# Patient Record
Sex: MALE | Race: WHITE | NOT HISPANIC OR LATINO | Employment: OTHER | URBAN - METROPOLITAN AREA
[De-identification: names, ages, dates, MRNs, and addresses within clinical notes are randomized per-mention and may not be internally consistent; named-entity substitution may affect disease eponyms.]

---

## 2017-08-11 ENCOUNTER — GENERIC CONVERSION - ENCOUNTER (OUTPATIENT)
Dept: OTHER | Facility: OTHER | Age: 82
End: 2017-08-11

## 2017-09-15 ENCOUNTER — GENERIC CONVERSION - ENCOUNTER (OUTPATIENT)
Dept: OTHER | Facility: OTHER | Age: 82
End: 2017-09-15

## 2017-12-11 ENCOUNTER — GENERIC CONVERSION - ENCOUNTER (OUTPATIENT)
Dept: OTHER | Facility: OTHER | Age: 82
End: 2017-12-11

## 2017-12-15 ENCOUNTER — ALLSCRIPTS OFFICE VISIT (OUTPATIENT)
Dept: OTHER | Facility: OTHER | Age: 82
End: 2017-12-15

## 2017-12-31 DIAGNOSIS — R33.9 RETENTION OF URINE: ICD-10-CM

## 2018-01-11 NOTE — MISCELLANEOUS
Message  SPOKE TO [address] [place] [address]   VERIFYING OXYBUTYNIN WAS BID  ERX TO PHARM AS REQUESTED BY PATIENT  VERIFIED ORIGINAL PRESCRIPTION IN [place]  Active Problems    1  Bladder spasms (596 89) (N32 89)    Current Meds   1  Amaryl 2 MG Oral Tablet (Glimepiride); Therapy: (Recorded:11Aug2017) to Recorded   2  Calcium Carbonate 500 MG TABS; Therapy: (Recorded:11Aug2017) to Recorded   3  Ferrous Sulfate 325 (65 Fe) MG Oral Tablet; Therapy: (Recorded:11Aug2017) to Recorded   4  Finasteride 5 MG Oral Tablet; Therapy: (Recorded:11Aug2017) to Recorded   5  Folic Acid 1 MG Oral Tablet; Therapy: (Recorded:11Aug2017) to Recorded   6  Gabapentin 300 MG Oral Capsule; Therapy: (Recorded:11Aug2017) to Recorded   7  Levothyroxine Sodium 50 MCG CAPS; Therapy: (Recorded:11Aug2017) to Recorded   8  Lisinopril 2 5 MG Oral Tablet; Therapy: (Recorded:11Aug2017) to Recorded   9  Lutein 6 MG Oral Capsule; Therapy: (Recorded:11Aug2017) to Recorded   10  MetFORMIN HCl - 1000 MG Oral Tablet; Therapy: (Recorded:11Aug2017) to Recorded   11  Metoprolol Tartrate 25 MG Oral Tablet; Therapy: (Recorded:11Aug2017) to Recorded   12  Oxybutynin Chloride 5 MG Oral Tablet; Therapy: (Recorded:11Aug2017) to Recorded   13  PredniSONE 5 MG Oral Tablet; Therapy: (Recorded:11Aug2017) to Recorded   14  Simvastatin 40 MG Oral Tablet; Therapy: (Recorded:11Aug2017) to Recorded   15  Vitamin C 1000 MG Oral Tablet; Therapy: (Recorded:11Aug2017) to Recorded   16  Warfarin Sodium 5 MG Oral Tablet; Therapy: (Recorded:11Aug2017) to Recorded    Allergies    1  No Known Drug Allergies    Plan  Bladder spasms    · Oxybutynin Chloride 5 MG Oral Tablet;  Take 1 tablet twice daily    Signatures   Electronically signed by : Yolanda Palma RN; Sep 15 2017 10:19AM EST                       (Author)

## 2018-01-11 NOTE — PROGRESS NOTES
Chief Complaint  NATE FROM [facility] CALLED, PT C/O BLADDER SPASMS AND ALSO HAVING TO FLUSH CATH MORE FREQUENTLY DUE TO SEDIMENT BUILD UP  iNSTRUCTED TO PUSH MORE FLUIDS AND WILL FORWARD FOR OXYBUTYNIN REFILLS TO BE SENT TO PHARMACY IN CHART  Current Meds   1  Amaryl 2 MG Oral Tablet; Therapy: (Recorded:11Aug2017) to Recorded   2  Calcium Carbonate 500 MG TABS; Therapy: (Recorded:11Aug2017) to Recorded   3  Ferrous Sulfate 325 (65 Fe) MG Oral Tablet; Therapy: (Recorded:11Aug2017) to Recorded   4  Finasteride 5 MG Oral Tablet; Therapy: (Recorded:11Aug2017) to Recorded   5  Folic Acid 1 MG Oral Tablet; Therapy: (Recorded:11Aug2017) to Recorded   6  Gabapentin 300 MG Oral Capsule; Therapy: (Recorded:11Aug2017) to Recorded   7  Levothyroxine Sodium 50 MCG CAPS; Therapy: (Recorded:11Aug2017) to Recorded   8  Lisinopril 2 5 MG Oral Tablet; Therapy: (Recorded:11Aug2017) to Recorded   9  Lutein 6 MG Oral Capsule; Therapy: (Recorded:11Aug2017) to Recorded   10  MetFORMIN HCl - 1000 MG Oral Tablet; Therapy: (Recorded:11Aug2017) to Recorded   11  Metoprolol Tartrate 25 MG Oral Tablet; Therapy: (Recorded:11Aug2017) to Recorded   12  Oxybutynin Chloride 5 MG Oral Tablet; Therapy: (Recorded:11Aug2017) to Recorded   13  PredniSONE 5 MG Oral Tablet; Therapy: (Recorded:11Aug2017) to Recorded   14  Simvastatin 40 MG Oral Tablet; Therapy: (Recorded:11Aug2017) to Recorded   15  Vitamin C 1000 MG Oral Tablet; Therapy: (Recorded:11Aug2017) to Recorded   16  Warfarin Sodium 5 MG Oral Tablet; Therapy: (Recorded:11Aug2017) to Recorded    Allergies    1   No Known Drug Allergies    Future Appointments    Date/Time Provider Specialty Site   12/12/2017 01:00 PM Timothy Crews MD Urology [address]     Signatures   Electronically signed by : Randolph Esquivel, ; Aug 11 2017  9:47AM EST                       (Co-author)    Electronically signed by : Emily Brock MD; Aug 18 2017  3:12PM EST                       (Author)

## 2018-01-23 NOTE — MISCELLANEOUS
Message     Recorded as Task   Date: 12/11/2017 09:13 AM, Created By: Daniel Pretty   Task Name: Call Back   Assigned To: Tulio CasianoB,TEAM   Regarding Patient: Fernando Capellan, Status: Active   CommentBeverely Spoon - 11 Dec 2017 9:13 AM     TASK CREATED  Caller: Dhiraj Cohen, Other; [phone]  [facility] VN lmom pt had prob  with cath  Fri into Sat  went to home calling with update  Nhi Flores - 11 Dec 2017 9:52 AM     TASK EDITED  PT HAD MARTIN CHANGED 12/8  MUST OF PULLED ON SAT EVENING,MARTIN STOPPED DRAINING  WAS CHANGED ON SUN  URINE CURRENTLY CLEAR, AFEBRILE  C/O RIGHT "KIDNEY" PAIN OFF AND ON  [facility] PT'S COMPLAINTS VAGUE  WILL OBTAIN CULTURE  DR Jcarlos Foster TO BE NOTIFIED  PER DR MARQUEZ PT TO HAVE RENAL ULTRASOUND  DAUGHTER ALBINA IS TRANSPORTATION [phone]  PT REQUESTING [facility]  TO CONTACT DAUGHTER WITH DATE AND TIME  1        1 Amended By: Sheldon Benoit; Dec 11 2017 2:58 PM EST    Active Problems   1  Bladder spasms (596 89) (N32 89)  2  UTI (urinary tract infection) (599 0) (N39 0)    Current Meds  1  Amaryl 2 MG Oral Tablet (Glimepiride); Therapy: (Recorded:11Aug2017) to Recorded  2  Calcium Carbonate 500 MG TABS; Therapy: (Recorded:11Aug2017) to Recorded  3  Cephalexin 250 MG Oral Capsule (Keflex); TAKE 1 CAPSULE TWICE DAILY; Therapy: [id number] to (Evaluate:11Nov2017)  Requested for: [id number]; Last   Rx:06Nov2017 Ordered  4  Ferrous Sulfate 325 (65 Fe) MG Oral Tablet; Therapy: (Recorded:11Aug2017) to Recorded  5  Finasteride 5 MG Oral Tablet; Therapy: (Recorded:11Aug2017) to Recorded  6  Folic Acid 1 MG Oral Tablet; Therapy: (Recorded:11Aug2017) to Recorded  7  Gabapentin 300 MG Oral Capsule; Therapy: (Recorded:11Aug2017) to Recorded  8  Levothyroxine Sodium 50 MCG CAPS; Therapy: (Recorded:11Aug2017) to Recorded  9  Lisinopril 2 5 MG Oral Tablet; Therapy: (Recorded:[id number]) to Recorded  10  Lutein 6 MG Oral Capsule;     Therapy: (Recorded:11Aug2017) to Recorded  11  MetFORMIN HCl - 1000 MG Oral Tablet; Therapy: (Recorded:11Aug2017) to Recorded  12  Metoprolol Tartrate 25 MG Oral Tablet; Therapy: (Recorded:11Aug2017) to Recorded  13  Oxybutynin Chloride 5 MG Oral Tablet; Take 1 tablet twice daily  Requested for:    18Sep2017; Last Rx:[id number] Ordered  14  PredniSONE 5 MG Oral Tablet; Therapy: (Recorded:11Aug2017) to Recorded  15  Simvastatin 40 MG Oral Tablet; Therapy: (Recorded:11Aug2017) to Recorded  16  Vitamin C 1000 MG Oral Tablet; Therapy: (Recorded:11Aug2017) to Recorded  17  Warfarin Sodium 5 MG Oral Tablet; Therapy: (Recorded:11Aug2017) to Recorded    Allergies   1   No Known Drug Allergies    Signatures   Electronically signed by : Kaity Ramirez RN; Dec 11 2017  2:58PM EST                       (Author)

## 2018-03-26 ENCOUNTER — TELEPHONE (OUTPATIENT)
Dept: UROLOGY | Facility: MEDICAL CENTER | Age: 83
End: 2018-03-26

## 2018-03-26 NOTE — TELEPHONE ENCOUNTER
Pt is due for catheter change, he normally has an 18F russell, but was in the hospital and now has a 20F  Per Dr Jad Uribe, change russell with a 20F

## 2018-03-28 ENCOUNTER — TELEPHONE (OUTPATIENT)
Dept: UROLOGY | Facility: AMBULATORY SURGERY CENTER | Age: 83
End: 2018-03-28

## 2018-03-28 NOTE — TELEPHONE ENCOUNTER
Spoke to Bere montez, original russell size 18 fr, not 20 fr as she thought  Did not need order to increase fr size

## 2018-03-28 NOTE — TELEPHONE ENCOUNTER
Spoke with visiting nurse, she wants a call back from one of the nurses  Please call her back [phone]  She had a question about catheter size

## 2018-06-01 ENCOUNTER — TELEPHONE (OUTPATIENT)
Dept: UROLOGY | Facility: AMBULATORY SURGERY CENTER | Age: 83
End: 2018-06-01

## 2018-06-01 NOTE — TELEPHONE ENCOUNTER
Spoke with home care nurse  Cinthia Moulton about patient  She has concerns about the catheter and there is leakage around it as well  Please call Cinthia Moulton back [phone]    Thank you

## 2018-06-01 NOTE — TELEPHONE ENCOUNTER
Spoke to Bere montez, pt C/O leaking from around catheter  Also has sore on shaft of penis  Pt not seen in office since 2016  Needs appt to be evaluated

## 2018-06-14 RX ORDER — FERROUS SULFATE 325(65) MG
TABLET ORAL
COMMUNITY

## 2018-06-14 RX ORDER — WARFARIN SODIUM 5 MG/1
TABLET ORAL
COMMUNITY
Start: 2018-05-21

## 2018-06-14 RX ORDER — SIMVASTATIN 40 MG
TABLET ORAL
COMMUNITY

## 2018-06-14 RX ORDER — HYDROCODONE/ACETAMINOPHEN 5 MG-500MG
TABLET ORAL
COMMUNITY

## 2018-06-14 RX ORDER — LISINOPRIL 2.5 MG/1
TABLET ORAL
COMMUNITY

## 2018-06-14 RX ORDER — FINASTERIDE 5 MG/1
TABLET, FILM COATED ORAL
COMMUNITY

## 2018-06-14 RX ORDER — GLIMEPIRIDE 2 MG/1
TABLET ORAL
COMMUNITY

## 2018-06-15 RX ORDER — OMEPRAZOLE 20 MG/1
20 CAPSULE, DELAYED RELEASE ORAL
COMMUNITY

## 2018-06-15 RX ORDER — GABAPENTIN 100 MG/1
100 CAPSULE ORAL
COMMUNITY
Start: 2018-05-22 | End: 2019-05-22

## 2018-06-15 RX ORDER — ACARBOSE 25 MG/1
25 TABLET ORAL
COMMUNITY
Start: 2018-05-25 | End: 2019-05-25

## 2018-06-15 RX ORDER — FUROSEMIDE 40 MG/1
TABLET ORAL
COMMUNITY
Start: 2018-05-25

## 2018-06-15 RX ORDER — FOLIC ACID 1 MG/1
TABLET ORAL
COMMUNITY
Start: 2018-04-14

## 2018-06-15 RX ORDER — OXYBUTYNIN CHLORIDE 5 MG/1
1 TABLET ORAL 2 TIMES DAILY
COMMUNITY
End: 2018-06-29 | Stop reason: ALTCHOICE

## 2018-06-15 RX ORDER — LEVOTHYROXINE SODIUM 88 UG/1
88 TABLET ORAL
COMMUNITY
Start: 2018-05-22 | End: 2019-05-22

## 2018-06-15 RX ORDER — ACETAMINOPHEN 500 MG
TABLET ORAL
COMMUNITY
Start: 2017-11-01

## 2018-06-15 RX ORDER — POTASSIUM CHLORIDE 20 MEQ/1
20 TABLET, EXTENDED RELEASE ORAL
COMMUNITY
Start: 2018-04-19 | End: 2019-04-19

## 2018-06-15 RX ORDER — PREDNISONE 1 MG/1
5 TABLET ORAL
COMMUNITY
Start: 2018-01-30

## 2018-06-15 RX ORDER — MULTIVIT WITH MINERALS/LUTEIN
TABLET ORAL
COMMUNITY

## 2018-06-18 ENCOUNTER — OFFICE VISIT (OUTPATIENT)
Dept: UROLOGY | Facility: MEDICAL CENTER | Age: 83
End: 2018-06-18
Payer: MEDICARE

## 2018-06-18 VITALS
DIASTOLIC BLOOD PRESSURE: 84 MMHG | WEIGHT: 230 LBS | BODY MASS INDEX: 34.07 KG/M2 | HEIGHT: 69 IN | SYSTOLIC BLOOD PRESSURE: 132 MMHG

## 2018-06-18 DIAGNOSIS — N32.89 BLADDER SPASM: ICD-10-CM

## 2018-06-18 DIAGNOSIS — Z85.46 HISTORY OF PROSTATE CANCER: ICD-10-CM

## 2018-06-18 DIAGNOSIS — R33.9 URINARY RETENTION: Primary | ICD-10-CM

## 2018-06-18 DIAGNOSIS — N31.2 HYPOTONIC BLADDER: ICD-10-CM

## 2018-06-18 PROCEDURE — 99214 OFFICE O/P EST MOD 30 MIN: CPT | Performed by: UROLOGY

## 2018-06-18 RX ORDER — FINASTERIDE 5 MG/1
TABLET, FILM COATED ORAL
COMMUNITY
Start: 2018-06-18 | End: 2018-12-18 | Stop reason: SDUPTHER

## 2018-06-18 NOTE — PATIENT INSTRUCTIONS
Start taking the oxybutynin 3 times a day, that is every morning, early afternoon, in late evening  Try that for a month, if it helps    The leakage, we will stay at 3 times a day    If it does not help, call my office, and my nurse will send a different medicine to the pharmacy to try to stop the leakage

## 2018-06-18 NOTE — PROGRESS NOTES
Assessment/Plan:  1  Increase oxybutynin to 3 times a day from two     2   If no help after 2-3 weeks, switched to tolterodine 2 mg twice per day    3  Follow-up six months  No problem-specific Assessment & Plan notes found for this encounter  Diagnoses and all orders for this visit:    Urinary retention    Hypotonic bladder    History of prostate cancer    Bladder spasm    Other orders  -     warfarin (COUMADIN) 5 mg tablet; Take 1/2 to 1 tablet by mouth daily or as directed  -     calcium-vitamin D (OSCAL-500) 500-400 MG-UNIT per tablet; Take 1 tablet by mouth  -     ferrous sulfate 325 (65 Fe) mg tablet; Take by mouth  -     finasteride (PROSCAR) 5 mg tablet; Take by mouth  -     metFORMIN (GLUCOPHAGE) 1000 MG tablet; Take by mouth  -     glimepiride (AMARYL) 2 mg tablet; Take by mouth  -     lisinopril (ZESTRIL) 2 5 mg tablet; Take by mouth  -     lutein 6 mg; Take by mouth  -     simvastatin (ZOCOR) 40 mg tablet; Take by mouth  -     acarbose (PRECOSE) 25 mg tablet; Take 25 mg by mouth  -     acetaminophen (TYLENOL) 500 mg tablet; Take 2 tabs 2x daily x 1 week, then decrease to 1 tab twice daily as needed for left shoulder pain  -     Discontinue: Ascorbic Acid, Vitamin C, (VITAMIN C) 100 MG tablet; Take 1,000 mg by mouth  -     Cholecalciferol 2000 units CAPS; Take 1 capsule by mouth  -     folic acid (FOLVITE) 1 mg tablet; TAKE 1 TABLET (1 MG TOTAL) BY MOUTH DAILY  -     furosemide (LASIX) 40 mg tablet; Take 1 tablet every day  Take an additional 1/2 tablet on Tuesday and Thursday or as directed by cardiology for weight gain   -     gabapentin (NEURONTIN) 100 mg capsule; Take 100 mg by mouth  -     glucagon (GLUCAGON EMERGENCY) 1 MG injection; 1 mg  -     insulin glargine (LANTUS SOLOSTAR) 100 units/mL injection pen; Inject 28 Units under the skin  -     levothyroxine 88 mcg tablet; Take 88 mcg by mouth  -     metoprolol tartrate (LOPRESSOR) 25 mg tablet;  Take by mouth  -     Multiple Vitamins-Minerals (MULTIVITAMIN ADULT PO); Take 1 tablet by mouth  -     omeprazole (PriLOSEC) 20 mg delayed release capsule; Take 20 mg by mouth  -     oxybutynin (DITROPAN) 5 mg tablet; Take 1 tablet by mouth 2 (two) times a day  -     potassium chloride (K-DUR,KLOR-CON) 20 mEq tablet; Take 20 mEq by mouth  -     predniSONE 5 mg tablet; Take 5 mg by mouth  -     Misc Natural Products (LUTEIN VISION BLEND PO); Take 1 tablet by mouth  -     Ascorbic Acid (VITAMIN C) 1000 MG tablet; Take by mouth  -     vitamin E, tocopherol, 200 units capsule; Take 200 Units by mouth  -     finasteride (PROSCAR) 5 mg tablet; TAKE 1 TABLET (5 MG TOTAL) BY MOUTH DAILY  Subjective:      Patient ID: Arslan Hermosillo is a 81 y o  male  Follow-up for long-term Mitchell catheter for neurogenic bladder and urinary retention  Increasing episodes of leakage around the catheter, does not feel any type of spasm urge to urinate before happens  He can't be quite specific how often this seems like once or twice per week, mainly nighttime  No hematuria infections or penile discharge        The following portions of the patient's history were reviewed and updated as appropriate: allergies, current medications, past family history, past medical history, past social history, past surgical history and problem list     Review of Systems   All other systems reviewed and are negative  Objective:      /84   Ht 5' 9" (1 753 m)   Wt 104 kg (230 lb)   BMI 33 97 kg/m²          Physical Exam   Abdominal:   Abdomen is soft nontender no distension   Genitourinary:   Genitourinary Comments: Mild foreskin edema, slight moisture around catheter but no true urethritis discharge, no inflammation

## 2018-06-18 NOTE — LETTER
June 18, 2018     Jamin Calix DO  [address]    Patient: Cee Jovel   YOB: 1935   Date of Visit: 6/18/2018       Dear Dr Isela Johnson: Thank you for referring Cee Jovel to me for evaluation  Below are my notes for this consultation  If you have questions, please do not hesitate to call me  I look forward to following your patient along with you  Sincerely,        Jessica Mae MD        CC: No Recipients  Jessica Mae MD  6/18/2018  1:51 PM  Sign at close encounter  Assessment/Plan:  1  Increase oxybutynin to 3 times a day from two     2   If no help after 2-3 weeks, switched to tolterodine 2 mg twice per day    3  Follow-up six months  No problem-specific Assessment & Plan notes found for this encounter  Diagnoses and all orders for this visit:    Urinary retention    Hypotonic bladder    History of prostate cancer    Bladder spasm    Other orders  -     warfarin (COUMADIN) 5 mg tablet; Take 1/2 to 1 tablet by mouth daily or as directed  -     calcium-vitamin D (OSCAL-500) 500-400 MG-UNIT per tablet; Take 1 tablet by mouth  -     ferrous sulfate 325 (65 Fe) mg tablet; Take by mouth  -     finasteride (PROSCAR) 5 mg tablet; Take by mouth  -     metFORMIN (GLUCOPHAGE) 1000 MG tablet; Take by mouth  -     glimepiride (AMARYL) 2 mg tablet; Take by mouth  -     lisinopril (ZESTRIL) 2 5 mg tablet; Take by mouth  -     lutein 6 mg; Take by mouth  -     simvastatin (ZOCOR) 40 mg tablet; Take by mouth  -     acarbose (PRECOSE) 25 mg tablet; Take 25 mg by mouth  -     acetaminophen (TYLENOL) 500 mg tablet; Take 2 tabs 2x daily x 1 week, then decrease to 1 tab twice daily as needed for left shoulder pain  -     Discontinue: Ascorbic Acid, Vitamin C, (VITAMIN C) 100 MG tablet; Take 1,000 mg by mouth  -     Cholecalciferol 2000 units CAPS;  Take 1 capsule by mouth  -     folic acid (FOLVITE) 1 mg tablet; TAKE 1 TABLET (1 MG TOTAL) BY MOUTH DAILY  -     furosemide (LASIX) 40 mg tablet; Take 1 tablet every day  Take an additional 1/2 tablet on Tuesday and Thursday or as directed by cardiology for weight gain   -     gabapentin (NEURONTIN) 100 mg capsule; Take 100 mg by mouth  -     glucagon (GLUCAGON EMERGENCY) 1 MG injection; 1 mg  -     insulin glargine (LANTUS SOLOSTAR) 100 units/mL injection pen; Inject 28 Units under the skin  -     levothyroxine 88 mcg tablet; Take 88 mcg by mouth  -     metoprolol tartrate (LOPRESSOR) 25 mg tablet; Take by mouth  -     Multiple Vitamins-Minerals (MULTIVITAMIN ADULT PO); Take 1 tablet by mouth  -     omeprazole (PriLOSEC) 20 mg delayed release capsule; Take 20 mg by mouth  -     oxybutynin (DITROPAN) 5 mg tablet; Take 1 tablet by mouth 2 (two) times a day  -     potassium chloride (K-DUR,KLOR-CON) 20 mEq tablet; Take 20 mEq by mouth  -     predniSONE 5 mg tablet; Take 5 mg by mouth  -     Misc Natural Products (LUTEIN VISION BLEND PO); Take 1 tablet by mouth  -     Ascorbic Acid (VITAMIN C) 1000 MG tablet; Take by mouth  -     vitamin E, tocopherol, 200 units capsule; Take 200 Units by mouth  -     finasteride (PROSCAR) 5 mg tablet; TAKE 1 TABLET (5 MG TOTAL) BY MOUTH DAILY  Subjective:      Patient ID: Gena Reyna is a 81 y o  male  Follow-up for long-term Mitchell catheter for neurogenic bladder and urinary retention  Increasing episodes of leakage around the catheter, does not feel any type of spasm urge to urinate before happens  He can't be quite specific how often this seems like once or twice per week, mainly nighttime  No hematuria infections or penile discharge        The following portions of the patient's history were reviewed and updated as appropriate: allergies, current medications, past family history, past medical history, past social history, past surgical history and problem list     Review of Systems   All other systems reviewed and are negative  Objective:      /84   Ht 5' 9" (1 753 m)   Wt 104 kg (230 lb)   BMI 33 97 kg/m²           Physical Exam   Abdominal:   Abdomen is soft nontender no distension   Genitourinary:   Genitourinary Comments: Mild foreskin edema, slight moisture around catheter but no true urethritis discharge, no inflammation

## 2018-06-29 DIAGNOSIS — N32.81 OAB (OVERACTIVE BLADDER): Primary | ICD-10-CM

## 2018-06-29 RX ORDER — TOLTERODINE TARTRATE 2 MG/1
2 TABLET, EXTENDED RELEASE ORAL 2 TIMES DAILY
COMMUNITY
Start: 2018-06-29 | End: 2018-06-29 | Stop reason: SDUPTHER

## 2018-06-29 NOTE — TELEPHONE ENCOUNTER
Spoke with Shital Medina, CHERY form  Home Care  Pt still having leakage from around Mitchell  Oxybutynin/increase ineffective  Would like to try Tolterodine as noted in OV by Dr Franklin Adams  Will forward refill medication to Dr Franklin Adams

## 2018-07-02 RX ORDER — TOLTERODINE TARTRATE 2 MG/1
2 TABLET, EXTENDED RELEASE ORAL 2 TIMES DAILY
Qty: 60 TABLET | Refills: 5 | Status: SHIPPED | OUTPATIENT
Start: 2018-07-02

## 2018-07-06 ENCOUNTER — TELEPHONE (OUTPATIENT)
Dept: UROLOGY | Facility: MEDICAL CENTER | Age: 83
End: 2018-07-06

## 2018-07-06 RX ORDER — CEPHALEXIN 500 MG/1
500 CAPSULE ORAL 2 TIMES DAILY
COMMUNITY
Start: 2018-07-06 | End: 2018-07-13

## 2018-07-06 NOTE — TELEPHONE ENCOUNTER
Per Dr Bessie Mejia cephalexin 500mg bid for 7 days  Flor Quinteros from [facility] was notified culture was positive and calling in ABX for pt  Pt notified